# Patient Record
Sex: FEMALE | NOT HISPANIC OR LATINO | Employment: FULL TIME | URBAN - METROPOLITAN AREA
[De-identification: names, ages, dates, MRNs, and addresses within clinical notes are randomized per-mention and may not be internally consistent; named-entity substitution may affect disease eponyms.]

---

## 2017-07-05 ENCOUNTER — OFFICE VISIT (OUTPATIENT)
Dept: URGENT CARE | Facility: PHYSICIAN GROUP | Age: 52
End: 2017-07-05

## 2017-07-05 ENCOUNTER — APPOINTMENT (OUTPATIENT)
Dept: RADIOLOGY | Facility: IMAGING CENTER | Age: 52
End: 2017-07-05
Attending: PHYSICIAN ASSISTANT

## 2017-07-05 VITALS
HEART RATE: 61 BPM | TEMPERATURE: 98.2 F | SYSTOLIC BLOOD PRESSURE: 120 MMHG | RESPIRATION RATE: 12 BRPM | OXYGEN SATURATION: 100 % | WEIGHT: 117 LBS | DIASTOLIC BLOOD PRESSURE: 74 MMHG
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
## 2017-07-05 PROCEDURE — 73610 X-RAY EXAM OF ANKLE: CPT | Mod: TC,LT | Performed by: PHYSICIAN ASSISTANT

## 2017-07-05 PROCEDURE — 72100 X-RAY EXAM L-S SPINE 2/3 VWS: CPT | Mod: TC | Performed by: PHYSICIAN ASSISTANT

## 2017-07-05 PROCEDURE — 99202 OFFICE O/P NEW SF 15 MIN: CPT | Mod: 25 | Performed by: PHYSICIAN ASSISTANT

## 2017-07-05 NOTE — MR AVS SNAPSHOT
Roxanekurtvivien Syed   2017 6:30 PM   Office Visit   MRN: [id number]    Department:  [facility]   Dept Phone:  [phone]    Description:  Female : 1965   Provider:  Dianna Beckham PA-C           Reason for Visit     Fall Slip and fall in grocery store, occured yesterday. Pt c/o lower back pain, Rt knee pain, Lt ankle pain and neck pain.       Allergies as of 2017     No Known Allergies      You were diagnosed with     Fall, initial encounter   [[id number]]         Vital Signs     Blood Pressure Pulse Temperature Respirations Weight Oxygen Saturation    120/74 mmHg 61 36.8 °C (98.2 °F) 12 53.071 kg (117 lb) 100%      Basic Information     Date Of Birth Sex Race Ethnicity Preferred Language    1965 Female Unable to Obtain Non- English      Health Maintenance     Patient has no pending health maintenance at this time      Current Immunizations     No immunizations on file.      Below and/or attached are the medications your provider expects you to take. Review all of your home medications and newly ordered medications with your provider and/or pharmacist. Follow medication instructions as directed by your provider and/or pharmacist. Please keep your medication list with you and share with your provider. Update the information when medications are discontinued, doses are changed, or new medications (including over-the-counter products) are added; and carry medication information at all times in the event of emergency situations     Allergies:  No Known Allergies          Medications  Valid as of: 2017 -  7:43 PM    Generic Name Brand Name Tablet Size Instructions for use    .                 Medicines prescribed today were sent to:     [facility] [address] [place] [id number]    Phone: [phone] Fax: [phone]    Open 24 Hours?: No      Medication refill instructions:       If your prescription bottle  indicates you have medication refills left, it is not necessary to call your provider’s office. Please contact your pharmacy and they will refill your medication.    If your prescription bottle indicates you do not have any refills left, you may request refills at any time through one of the following ways: The online [employer] system (except Urgent Care), by calling your provider’s office, or by asking your pharmacy to contact your provider’s office with a refill request. Medication refills are processed only during regular business hours and may not be available until the next business day. Your provider may request additional information or to have a follow-up visit with you prior to refilling your medication.   *Please Note: Medication refills are assigned a new Rx number when refilled electronically. Your pharmacy may indicate that no refills were authorized even though a new prescription for the same medication is available at the pharmacy. Please request the medicine by name with the pharmacy before contacting your provider for a refill.        Your To Do List     Future Labs/Procedures Complete By Expires    DX-ANKLE 3+ VIEWS LEFT  As directed 7/5/2018    DX-LUMBAR SPINE-2 OR 3 VIEWS  As directed 7/5/2018         Ba[employer] Access Code: [email or web address]  Expires: 8/4/2017  7:43 PM    Your email address is not on file at [facility].  Email Addresses are required for you to sign up for [employer], please contact [phone] to verify your personal information and to provide your email address prior to attempting to register for [employer].    Lukas Syed  [address] [place] [address]    [employer] Planet  A secure, online tool to manage your health information     [employer]’s [employer] Planet® is a secure, online tool that connects you to your personalized health information from the privacy of your home -- day or night - making it very easy for you to manage your healthcare. Once the activation process is  completed, you can even access your medical information using the Studentbox charline, which is available for free in the Apple Charline store or Google Play store.     To learn more about Studentbox, visit [email or web address]    There are two levels of access available (as shown below):   My Chart Features  [facility]  Specialists [facility]  Urgent  Care Non-[facility] Doctor   Email your healthcare team securely and privately 24/7 X X X    Manage appointments: schedule your next appointment; view details of past/upcoming appointments X      Request prescription refills. X      View recent personal medical records, including lab and immunizations X X X X   View health record, including health history, allergies, medications X X X X   Read reports about your outpatient visits, procedures, consult and ER notes X X X X   See your discharge summary, which is a recap of your hospital and/or ER visit that includes your diagnosis, lab results, and care plan X X  X     How to register for Studentbox:  Once your e-mail address has been verified, follow the following steps to sign up for Studentbox.     1. Go to  [email or web address]  2. Click on the Sign Up Now box, which takes you to the New Member Sign Up page. You will need to provide the following information:  a. Enter your Studentbox Access Code exactly as it appears at the top of this page. (You will not need to use this code after you’ve completed the sign-up process. If you do not sign up before the expiration date, you must request a new code.)   b. Enter your date of birth.   c. Enter your home email address.   d. Click Submit, and follow the next screen’s instructions.  3. Create a Studentbox ID. This will be your Studentbox login ID and cannot be changed, so think of one that is secure and easy to remember.  4. Create a Studentbox password. You can change your password at any time.  5. Enter your Password Reset Question and Answer. This can be used at a  later time if you forget your password.   6. Enter your e-mail address. This allows you to receive e-mail notifications when new information is available in [employer].  7. Click Sign Up. You can now view your health information.    For assistance activating your [employer] account, call [phone]

## 2017-07-07 ASSESSMENT — ENCOUNTER SYMPTOMS
NAUSEA: 0
SHORTNESS OF BREATH: 0
HEADACHES: 0
VISUAL CHANGE: 0
LOSS OF CONSCIOUSNESS: 0
FEVER: 0
SORE THROAT: 0
VOMITING: 0
BACK PAIN: 1
ABDOMINAL PAIN: 0
DIZZINESS: 0
CHILLS: 0
DIARRHEA: 0

## 2017-07-07 NOTE — PROGRESS NOTES
Subjective:      Lukas Syed is a 52 y.o. female who presents with Fall        Patient is accompanied by her .     Fall  The accident occurred 3 to 6 hours ago. The fall occurred while walking. She landed on hard floor. There was no blood loss. The point of impact was the buttocks. The pain is present in the back and right knee (left ankle). The pain is at a severity of 3/10. The pain is mild. The symptoms are aggravated by movement. Pertinent negatives include no abdominal pain, fever, headaches, loss of consciousness, nausea, visual change or vomiting. She has tried nothing for the symptoms.     Patient presents to urgent care reporting low back pain, left ankle pain, and right knee after falling yesterday in a grocery store. States there was some water on the ground and she slipped, landing mostly on her buttocks and back. Ankle and knee pain is worsened with walking. States she did not hit her head or loose consciousness. No previous back, ankle, or knee injuries.      Review of Systems   Constitutional: Negative for fever and chills.   HENT: Negative for sore throat.    Respiratory: Negative for shortness of breath.    Cardiovascular: Negative for chest pain.   Gastrointestinal: Negative for nausea, vomiting, abdominal pain and diarrhea.   Genitourinary: Negative.    Musculoskeletal: Positive for back pain.        Positive for ankle pain and knee pain   Neurological: Negative for dizziness, loss of consciousness and headaches.          Objective:     /74 mmHg  Pulse 61  Temp(Src) 36.8 °C (98.2 °F)  Resp 12  Wt 53.071 kg (117 lb)  SpO2 100%     Physical Exam   Constitutional: She is oriented to person, place, and time. She appears well-developed and well-nourished. No distress.   HENT:   Head: Normocephalic and atraumatic.   Eyes: Pupils are equal, round, and reactive to light.   Neck: Normal range of motion.   Cardiovascular: Normal rate.    Pulmonary/Chest: Effort normal.    Musculoskeletal:        Right knee: She exhibits normal range of motion, no swelling, no ecchymosis, no LCL laxity, no bony tenderness, normal meniscus and no MCL laxity. Tenderness found. Medial joint line tenderness noted.        Left ankle: She exhibits decreased range of motion and swelling. Tenderness. Lateral malleolus tenderness found.        Lumbar back: She exhibits pain. She exhibits normal range of motion, no tenderness, no bony tenderness and no spasm.        Legs:       Feet:    Neurological: She is alert and oriented to person, place, and time.   Skin: Skin is warm and dry. She is not diaphoretic.   Psychiatric: She has a normal mood and affect. Her behavior is normal.   Nursing note and vitals reviewed.         Medications, Allergies, and current problem list reviewed today in Epic       Assessment/Plan:     1. Fall, initial encounter  - DX-LUMBAR SPINE-2 OR 3 VIEWS; Future  Impression        Mild degenerative change and scoliosis without evidence of fracture.             - DX-ANKLE 3+ VIEWS LEFT; Future      Impression        Normal ankle series.         Informed patient of x-ray results. Encouraged RICE therapy. OTC ibuprofen as needed for pain, take with food. Gentle stretching and massage. Call or return to office if symptoms persist or worsen.  The patient demonstrated a good understanding and agreed with the treatment plan.